# Patient Record
Sex: FEMALE | Race: OTHER | URBAN - METROPOLITAN AREA
[De-identification: names, ages, dates, MRNs, and addresses within clinical notes are randomized per-mention and may not be internally consistent; named-entity substitution may affect disease eponyms.]

---

## 2017-02-24 ENCOUNTER — IMPORTED ENCOUNTER (OUTPATIENT)
Dept: URBAN - METROPOLITAN AREA CLINIC 9 | Facility: CLINIC | Age: 57
End: 2017-02-24

## 2017-04-11 ENCOUNTER — IMPORTED ENCOUNTER (OUTPATIENT)
Dept: URBAN - METROPOLITAN AREA CLINIC 9 | Facility: CLINIC | Age: 57
End: 2017-04-11

## 2017-07-11 ENCOUNTER — IMPORTED ENCOUNTER (OUTPATIENT)
Dept: URBAN - METROPOLITAN AREA CLINIC 9 | Facility: CLINIC | Age: 57
End: 2017-07-11

## 2018-06-01 ENCOUNTER — IMPORTED ENCOUNTER (OUTPATIENT)
Dept: URBAN - METROPOLITAN AREA CLINIC 9 | Facility: CLINIC | Age: 58
End: 2018-06-01

## 2018-11-15 ENCOUNTER — IMPORTED ENCOUNTER (OUTPATIENT)
Dept: URBAN - METROPOLITAN AREA CLINIC 9 | Facility: CLINIC | Age: 58
End: 2018-11-15

## 2019-02-21 ENCOUNTER — IMPORTED ENCOUNTER (OUTPATIENT)
Dept: URBAN - METROPOLITAN AREA CLINIC 9 | Facility: CLINIC | Age: 59
End: 2019-02-21

## 2019-04-24 NOTE — PATIENT DISCUSSION
(H25.12) Age-related nuclear cataract, left eye - Assesment : Examination revealed cataract.  THE GOAL OF THE CATARACT SURGERY IS TO IMPROVE THE QUALITY AND CLARITY OF VISION - Plan : 3 MONTH FOLLOW UP

## 2019-04-24 NOTE — PATIENT DISCUSSION
(G47.575) Vitreous degeneration, bilateral - Assesment : Examination revealed PVD - Plan : Monitor for changes. Advised patient to call our office with decreased vision or an increase in flashes and/or floaters.

## 2019-04-25 ENCOUNTER — IMPORTED ENCOUNTER (OUTPATIENT)
Dept: URBAN - METROPOLITAN AREA CLINIC 9 | Facility: CLINIC | Age: 59
End: 2019-04-25

## 2019-05-30 NOTE — PATIENT DISCUSSION
(H25.12) Age-related nuclear cataract, left eye - Assesment : Examination revealed cataract. THE GOAL OF THE CATARACT SURGERY IS TO IMPROVE THE QUALITY AND CLARITY OF VISION. HX OF AMBLYOPIA OS. THE CATARACT IS MAKING THINGS BLURRY AND MORE YELLOW AND BROWN. PATIENT ENJOYS SHOOTING AND HAVING TROUBLE SEEING THE TARGET AND THE SIGHT ON THE PISTOL. DISCUSSED MFIOL TO HELP WITH DISTANCE, INTERMEDIATE, AND UP CLOSE VISION,   PATIENT IS AWARE THAT HALOS OR [place] CAN OCCUR WITH THE MFIOL  CONSIDER MFIOL FOR TARGET SHOOTING - Plan : PATIENT TO TALK TO SURGERY COUNSELOR TODAY.    CONSIDER MFIOL FOR TARGET SHOOTING

## 2019-05-30 NOTE — PATIENT DISCUSSION
(H26.812) Other secondary cataract, right eye - Assesment : Significant posterior capsule opacification present. S/P YAG - Plan : Monitor for changes. Advised patient to call our office with decreased vision or an increase in symptoms.

## 2019-12-05 ENCOUNTER — IMPORTED ENCOUNTER (OUTPATIENT)
Dept: URBAN - METROPOLITAN AREA CLINIC 9 | Facility: CLINIC | Age: 59
End: 2019-12-05

## 2019-12-20 ENCOUNTER — IMPORTED ENCOUNTER (OUTPATIENT)
Dept: URBAN - METROPOLITAN AREA CLINIC 9 | Facility: CLINIC | Age: 59
End: 2019-12-20

## 2020-06-05 ENCOUNTER — IMPORTED ENCOUNTER (OUTPATIENT)
Dept: URBAN - METROPOLITAN AREA CLINIC 9 | Facility: CLINIC | Age: 60
End: 2020-06-05

## 2020-06-12 ENCOUNTER — IMPORTED ENCOUNTER (OUTPATIENT)
Dept: URBAN - METROPOLITAN AREA CLINIC 9 | Facility: CLINIC | Age: 60
End: 2020-06-12

## 2020-07-10 ENCOUNTER — IMPORTED ENCOUNTER (OUTPATIENT)
Dept: URBAN - METROPOLITAN AREA CLINIC 9 | Facility: CLINIC | Age: 60
End: 2020-07-10

## 2020-07-30 ENCOUNTER — IMPORTED ENCOUNTER (OUTPATIENT)
Dept: URBAN - METROPOLITAN AREA CLINIC 9 | Facility: CLINIC | Age: 60
End: 2020-07-30

## 2020-08-14 ENCOUNTER — IMPORTED ENCOUNTER (OUTPATIENT)
Dept: URBAN - METROPOLITAN AREA CLINIC 9 | Facility: CLINIC | Age: 60
End: 2020-08-14

## 2020-09-18 ENCOUNTER — IMPORTED ENCOUNTER (OUTPATIENT)
Dept: URBAN - METROPOLITAN AREA CLINIC 9 | Facility: CLINIC | Age: 60
End: 2020-09-18

## 2021-02-26 ENCOUNTER — IMPORTED ENCOUNTER (OUTPATIENT)
Dept: URBAN - METROPOLITAN AREA CLINIC 9 | Facility: CLINIC | Age: 61
End: 2021-02-26

## 2021-03-19 ENCOUNTER — IMPORTED ENCOUNTER (OUTPATIENT)
Dept: URBAN - METROPOLITAN AREA CLINIC 9 | Facility: CLINIC | Age: 61
End: 2021-03-19

## 2021-05-28 ENCOUNTER — IMPORTED ENCOUNTER (OUTPATIENT)
Dept: URBAN - METROPOLITAN AREA CLINIC 9 | Facility: CLINIC | Age: 61
End: 2021-05-28

## 2021-10-18 ASSESSMENT — VISUAL ACUITY
OS_SC: 20/30 SN
OD_SC: 20/30 SN
OS_SC: 20/30 SN
OS_SC: 20/30 SN
OD_SC: 20/30 SN
OS_CC: 20/30 SN
OD_SC: 20/30 SN
OD_SC: 20/30 SN
OS_SC: 20/30 SN
OD_CC: 20/30 SN
OD_SC: 20/30 SN
OD_SC: 20/30 - SN
OS_SC: 20/30 SN
OS_SC: 20/30 SN
OS_CC: 20/30 SN
OS_SC: 20/30 SN
OS_SC: 20/30 - SN
OD_SC: 20/30 - SN
OS_SC: 20/30 SN
OD_SC: 20/30 SN
OD_CC: 20/30 SN
OD_CC: 20/30 SN
OS_SC: 20/30 SN
OS_SC: 20/30 SN
OS_CC: 20/30 SN
OD_SC: 20/30 SN
OD_SC: 20/30 SN
OS_SC: 20/30 SN
OD_SC: 20/30 SN
OD_SC: 20/30 SN
OS_SC: 20/30 SN
OS_SC: 20/30 - SN

## 2022-01-10 ENCOUNTER — CLINIC PROCEDURE ONLY (OUTPATIENT)
Dept: URBAN - METROPOLITAN AREA CLINIC 15 | Facility: CLINIC | Age: 62
End: 2022-01-10

## 2022-01-10 DIAGNOSIS — L98.8: ICD-10-CM

## 2022-01-10 PROCEDURE — 11951 SUBQ NJX FIL MATRL 1.1-5.0CC: CPT

## 2022-01-10 ASSESSMENT — VISUAL ACUITY
OD_SC: 20/30
OS_SC: 20/30

## 2022-01-24 ENCOUNTER — CLINIC PROCEDURE ONLY (OUTPATIENT)
Dept: URBAN - METROPOLITAN AREA CLINIC 15 | Facility: CLINIC | Age: 62
End: 2022-01-24

## 2022-01-24 DIAGNOSIS — L98.8: ICD-10-CM

## 2022-01-24 PROCEDURE — 96999SR1

## 2022-01-24 ASSESSMENT — VISUAL ACUITY
OS_SC: 20/30
OD_SC: 20/30

## 2022-04-11 ENCOUNTER — CLINIC PROCEDURE ONLY (OUTPATIENT)
Dept: URBAN - METROPOLITAN AREA CLINIC 15 | Facility: CLINIC | Age: 62
End: 2022-04-11

## 2022-04-11 DIAGNOSIS — L98.8: ICD-10-CM

## 2022-04-11 PROCEDURE — 96999VP

## 2022-04-11 ASSESSMENT — VISUAL ACUITY
OD_SC: 20/30
OS_SC: 20/30

## 2022-06-20 RX ORDER — PROGESTERONE 200 MG/1
CAPSULE ORAL
COMMUNITY
End: 2022-10-04 | Stop reason: ALTCHOICE

## 2022-06-20 RX ORDER — OMEPRAZOLE 40 MG/1
1 CAPSULE, DELAYED RELEASE ORAL
COMMUNITY
Start: 2022-03-22 | End: 2022-10-31

## 2022-06-20 RX ORDER — TRIAMCINOLONE ACETONIDE 1 MG/G
CREAM TOPICAL
COMMUNITY
Start: 2022-02-22 | End: 2022-10-31

## 2022-06-20 RX ORDER — PROGESTERONE 100 MG/1
CAPSULE ORAL
COMMUNITY
Start: 2021-06-09 | End: 2022-10-20

## 2022-06-20 RX ORDER — ZOLPIDEM TARTRATE 10 MG/1
TABLET ORAL
COMMUNITY
Start: 2021-10-28 | End: 2022-10-31 | Stop reason: SDUPTHER

## 2022-06-20 RX ORDER — ESTRADIOL 1 MG/1
TABLET ORAL
COMMUNITY
End: 2022-10-01 | Stop reason: SDUPTHER

## 2022-10-04 PROBLEM — G47.00 INSOMNIA: Status: ACTIVE | Noted: 2022-10-04

## 2022-10-04 PROBLEM — L40.9 PSORIASIS: Status: ACTIVE | Noted: 2022-10-04

## 2022-10-04 PROBLEM — E78.5 HYPERLIPIDEMIA: Status: ACTIVE | Noted: 2022-10-04

## 2022-10-04 PROBLEM — F41.9 ANXIETY: Status: ACTIVE | Noted: 2022-10-04

## 2022-10-20 PROBLEM — N95.9 MENOPAUSAL AND POSTMENOPAUSAL DISORDER: Status: ACTIVE | Noted: 2022-10-20

## 2022-10-20 PROBLEM — Z78.0 MENOPAUSE: Status: ACTIVE | Noted: 2022-10-20

## 2022-10-20 PROBLEM — Z01.419 WOMEN'S ANNUAL ROUTINE GYNECOLOGICAL EXAMINATION: Status: ACTIVE | Noted: 2022-10-20

## 2022-10-20 PROBLEM — Z12.4 CERVICAL CANCER SCREENING: Status: ACTIVE | Noted: 2022-10-20

## 2022-10-20 PROBLEM — Z12.31 BREAST CANCER SCREENING BY MAMMOGRAM: Status: ACTIVE | Noted: 2022-10-20

## 2022-10-20 PROBLEM — Z11.51 SPECIAL SCREENING EXAMINATION FOR HUMAN PAPILLOMAVIRUS (HPV): Status: ACTIVE | Noted: 2022-10-20

## 2022-10-31 PROBLEM — Z01.419 WOMEN'S ANNUAL ROUTINE GYNECOLOGICAL EXAMINATION: Status: RESOLVED | Noted: 2022-10-20 | Resolved: 2022-10-31

## 2022-10-31 PROBLEM — Z12.31 BREAST CANCER SCREENING BY MAMMOGRAM: Status: RESOLVED | Noted: 2022-10-20 | Resolved: 2022-10-31

## 2022-10-31 PROBLEM — Z12.4 CERVICAL CANCER SCREENING: Status: RESOLVED | Noted: 2022-10-20 | Resolved: 2022-10-31

## 2022-10-31 PROBLEM — Z11.51 SPECIAL SCREENING EXAMINATION FOR HUMAN PAPILLOMAVIRUS (HPV): Status: RESOLVED | Noted: 2022-10-20 | Resolved: 2022-10-31
